# Patient Record
Sex: MALE | Race: OTHER | HISPANIC OR LATINO
[De-identification: names, ages, dates, MRNs, and addresses within clinical notes are randomized per-mention and may not be internally consistent; named-entity substitution may affect disease eponyms.]

---

## 2024-03-20 PROBLEM — Z00.00 ENCOUNTER FOR PREVENTIVE HEALTH EXAMINATION: Status: ACTIVE | Noted: 2024-03-20

## 2024-04-01 ENCOUNTER — RESULT CHARGE (OUTPATIENT)
Age: 64
End: 2024-04-01

## 2024-04-01 ENCOUNTER — APPOINTMENT (OUTPATIENT)
Dept: ORTHOPEDIC SURGERY | Facility: CLINIC | Age: 64
End: 2024-04-01
Payer: COMMERCIAL

## 2024-04-01 DIAGNOSIS — Z78.9 OTHER SPECIFIED HEALTH STATUS: ICD-10-CM

## 2024-04-01 DIAGNOSIS — M47.26 OTHER SPONDYLOSIS WITH RADICULOPATHY, LUMBAR REGION: ICD-10-CM

## 2024-04-01 PROCEDURE — 99203 OFFICE O/P NEW LOW 30 MIN: CPT

## 2024-04-01 PROCEDURE — 72100 X-RAY EXAM L-S SPINE 2/3 VWS: CPT

## 2024-04-01 NOTE — IMAGING
[de-identified] : He is alert and oriented vital signs are stable.  He is able to stand on his toes and his heels  Examination lumbar sacral spine reveals tenderness palpation the right paraspinal region with no evidence for spasm.  He is able to forward flex to 50 degrees extension of the spine of 15 degrees lateral rotation of 20 degrees and lateral flexion of 20 degrees.  He had 5-5 strength bilateral lower extremities 2+ deep tendon reflexes throughout negative straight leg raising  He has a normal neurologic exam normal vascular exam normal skin exam.  There is no evidence of open wounds infection or compartment syndrome.  Examination of the right small finger and hand reveals some tenderness in the palm region around the flexor tendon sheath and A1 pulley region.  There is no evidence for swelling.  His flexor digitorum superficialis tendon was intact.  His FDP tendon was not intact at this time.  He was unable to make a full fist as the FDP did not appear intact.  No locking or clicking his extensor tendon was intact.  There is no evidence for open wounds. [FreeTextEntry1] : X-rays lumbar sacral spine 2 views revealed no fractures or dislocations.  There is evidence for some degenerative disc disease in the lumbar spine most pronounced at L5-S1

## 2024-04-01 NOTE — HISTORY OF PRESENT ILLNESS
[de-identified] : Patient 63-year-old male presents with a 1 month history of low back pain.  He says his pain in the low back on the right side going down the right leg to the foot.  Denies any numbness or tingling down the legs or any bowel bladder symptoms.  He denies any injury.  He is seen with our  Romie our medical assistant  Patient is also complaining of 1 month history of problems bending his right small finger.  Patient states he was in [place] and lifting something and since times not been able to flex the tip of his right small finger.  Denies any lacerations at that time.

## 2024-04-01 NOTE — ASSESSMENT
[FreeTextEntry1] : Lumbosacral degenerative disc disease with complaints of radiculopathy Possible flexor tendon injury right hand/small finger FDP  Plan anti-inflammatories with GI precautions the heating pad and I recommend a course of conservative treatment of physical therapy.  If does not respond to physical therapy he may require an MRI.  He was seen with a .  He was advised not respond to physical therapy in 4 weeks he should follow-up.  All questions were answered.  For the right small finger x-rays were deferred at this time and can be taken when she is seen by Dr. Diop.  I did advise him I do recommend he see one of our other hand surgeons.  I did advise him this time I do recommend he see Dr. Diop for a full consultation.  I did advise him that I am one of the hand surgeons here but not an operative hand surgeon at this time due to underlying medical condition and this could end up requiring surgery as his FDP tendon does not appear to be working at this time.  I did advise him he could have it into the FDP tendon when he was lifting and [place] a month ago I do advise him to do full consultation at this time with one of our operative hand surgeons and I will see Dr. Diop.  Symptoms of Cauda equina were discussed with the patient. Patient was advised if they develop any numbness or tingling down the legs, weakness, or bowel or bladder symptoms they must call the office or go to the ER immediately.  This medical record was created utilizing Dragon voice recognition software.  This software is not perfect and may occasionally create typographical errors which may be reflected in the above medical record.

## 2024-04-03 ENCOUNTER — APPOINTMENT (OUTPATIENT)
Dept: ORTHOPEDIC SURGERY | Facility: CLINIC | Age: 64
End: 2024-04-03
Payer: COMMERCIAL

## 2024-04-03 DIAGNOSIS — Z78.9 OTHER SPECIFIED HEALTH STATUS: ICD-10-CM

## 2024-04-03 PROCEDURE — 99213 OFFICE O/P EST LOW 20 MIN: CPT

## 2024-04-03 PROCEDURE — 73130 X-RAY EXAM OF HAND: CPT | Mod: RT

## 2024-04-03 RX ORDER — ALLOPURINOL 100 MG/1
100 TABLET ORAL
Qty: 90 | Refills: 0 | Status: ACTIVE | COMMUNITY
Start: 2024-03-20

## 2024-04-03 RX ORDER — COLCHICINE 0.6 MG/1
0.6 TABLET ORAL
Qty: 3 | Refills: 0 | Status: ACTIVE | COMMUNITY
Start: 2024-03-20

## 2024-04-03 RX ORDER — NAPROXEN 500 MG/1
500 TABLET ORAL
Qty: 14 | Refills: 0 | Status: ACTIVE | COMMUNITY
Start: 2024-03-20

## 2024-04-03 RX ORDER — CYCLOBENZAPRINE HYDROCHLORIDE 10 MG/1
10 TABLET, FILM COATED ORAL
Qty: 20 | Refills: 0 | Status: ACTIVE | COMMUNITY
Start: 2024-03-20

## 2024-04-03 RX ORDER — ATORVASTATIN CALCIUM 10 MG/1
10 TABLET, FILM COATED ORAL
Qty: 90 | Refills: 0 | Status: ACTIVE | COMMUNITY
Start: 2024-03-22

## 2024-04-03 NOTE — IMAGING
[de-identified] : RIGHT HAND skin intact. no swelling. TTP to SF A1 pulley. wrist ROM: good extension, flexion. good pronation, supination. SF: good extension. PIPJ mild limited flexion, DIPJ no active flexion but good passive flexion. good flex/ext other digits.  SILT to median, ulnar, radial distribution.  palpable radial pulse, brisk cap refill all digits. no triggering.   XRAYS OF RIGHT HAND: no acute displaced fracture or dislocation. djd of DIPJ of index, middle, small fingers and PIPJ of index finger. djd of radiocarpal joint with well-corticated ossification dorsal to radiocarpal joint.

## 2024-04-03 NOTE — ASSESSMENT
[FreeTextEntry1] : The condition was explained to the patient - likely FDP rupture. Unlikely to be amenable to primary repair due to chronicity of injury. Could consider flexor tendon reconstruction, but increased risk of stiffness with intact FDS and underlying arthritis. Also discussed increased propensity for stiffness in small finger, especially PIPJ. - MRI R small finger to evaluate for flexor tendon rupture. - applied diana straps to SF/RF, PRN activity. reviewed application of strap (slide only one finger through loop, then strap to adjacent finger, tight enough that the strap does not slide off, but not so tight that it causes indentation or discoloration. patient expressed understanding. - prescribed OT for R hand  F/u after MRI.

## 2024-04-03 NOTE — HISTORY OF PRESENT ILLNESS
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 Denies numbness/tingling. Saw Dr. Mathews on 4/1/24 => referred here.  Hx: HLD. Gout. [] : no [FreeTextEntry5] : R. Hand pain that started 1 month ago w/o known injury. Pain along the right pinky finger radiating into wrist. Needs .  [FreeTextEntry7] : right wrist

## 2024-04-15 ENCOUNTER — APPOINTMENT (OUTPATIENT)
Dept: MRI IMAGING | Facility: CLINIC | Age: 64
End: 2024-04-15
Payer: COMMERCIAL

## 2024-04-15 PROCEDURE — 73218 MRI UPPER EXTREMITY W/O DYE: CPT | Mod: RT

## 2024-04-25 ENCOUNTER — APPOINTMENT (OUTPATIENT)
Dept: ORTHOPEDIC SURGERY | Facility: CLINIC | Age: 64
End: 2024-04-25
Payer: COMMERCIAL

## 2024-04-25 ENCOUNTER — NON-APPOINTMENT (OUTPATIENT)
Age: 64
End: 2024-04-25

## 2024-04-25 VITALS — WEIGHT: 205 LBS | BODY MASS INDEX: 32.18 KG/M2 | HEIGHT: 67 IN

## 2024-04-25 DIAGNOSIS — S66.801A: ICD-10-CM

## 2024-04-25 PROCEDURE — 20550 NJX 1 TENDON SHEATH/LIGAMENT: CPT

## 2024-04-25 PROCEDURE — 99203 OFFICE O/P NEW LOW 30 MIN: CPT | Mod: 25

## 2024-04-29 ENCOUNTER — APPOINTMENT (OUTPATIENT)
Dept: ORTHOPEDIC SURGERY | Facility: CLINIC | Age: 64
End: 2024-04-29
Payer: COMMERCIAL

## 2024-04-29 DIAGNOSIS — M47.816 SPONDYLOSIS W/OUT MYELOPATHY OR RADICULOPATHY, LUMBAR REGION: ICD-10-CM

## 2024-04-29 PROCEDURE — 99213 OFFICE O/P EST LOW 20 MIN: CPT

## 2024-04-29 NOTE — ASSESSMENT
[FreeTextEntry1] : Lumbosacral degenerative disc disease with complaints of radiculopathy  Plan anti-inflammatories with GI precautions the heating pad and I recommend he resume physical therapy.  I did advise him to get back into the physical therapy he will be seen back in 4 to 6 weeks.  If symptoms not improving may need an MRI at that time.  At this point does not require the MRI as there is no radicular symptoms. All questions were answered agreed with the plan.  He will continue with the hand doctor that is treating him. he is going away for 2 weeks and will start PT when he returns.  Symptoms of Cauda equina were discussed with the patient. Patient was advised if they develop any numbness or tingling down the legs, weakness, or bowel or bladder symptoms they must call the office or go to the ER immediately.  This medical record was created utilizing Dragon voice recognition software.  This software is not perfect and may occasionally create typographical errors which may be reflected in the above medical record.

## 2024-04-29 NOTE — IMAGING
[de-identified] : He is alert and oriented vital signs are stable.  He is able to stand on his toes and his heels  Examination lumbar sacral spine reveals tenderness palpation the right paraspinal region with no evidence for spasm.  He is able to forward flex to 50 degrees extension of the spine of 15 degrees lateral rotation of 20 degrees and lateral flexion of 20 degrees.  He had 5-5 strength bilateral lower extremities 2+ deep tendon reflexes throughout negative straight leg raising  He has a normal neurologic exam normal vascular exam normal skin exam.  There is no evidence of open wounds infection or compartment syndrome.   [FreeTextEntry1] : X-rays lumbar sacral spine 2 views revealed no fractures or dislocations.  There is evidence for some degenerative disc disease in the lumbar spine most pronounced at L5-S1

## 2024-04-29 NOTE — HISTORY OF PRESENT ILLNESS
[de-identified] : Patient 63-year-old male presents with a 1 month history of low back pain.  He says his pain in the low back on the right side going down the right leg to the foot.  Denies any numbness or tingling down the legs or any bowel bladder symptoms.  He denies any injury.  He is seen with our  Romie our medical assistant  Patient is also complaining of 1 month history of problems bending his right small finger.  Patient states he was in [place] and lifting something and since times not been able to flex the tip of his right small finger.  Denies any lacerations at that time.  April 29, 2024.  Patient is to follow-up for his lumbar spine.  He is seen with his daughter who translated.  He says his low back has improved some but still having pain on the right side.  He only went to physical therapy 1 time.  He is does not have any numbness or tingling down the legs at this time.  He says that improved.  Denies any bowel bladder symptoms.  Dr. Diop about his hand and had MRI performed and then saw Dr. Cosby and had an injection is treating with them at this time.

## 2024-05-06 ENCOUNTER — APPOINTMENT (OUTPATIENT)
Dept: ORTHOPEDIC SURGERY | Facility: CLINIC | Age: 64
End: 2024-05-06

## 2025-08-19 ENCOUNTER — OFFICE (OUTPATIENT)
Facility: LOCATION | Age: 65
Setting detail: OPHTHALMOLOGY
End: 2025-08-19
Payer: MEDICAID

## 2025-08-19 DIAGNOSIS — H16.223: ICD-10-CM

## 2025-08-19 DIAGNOSIS — H25.13: ICD-10-CM

## 2025-08-19 DIAGNOSIS — H40.013: ICD-10-CM

## 2025-08-19 PROCEDURE — 92133 CPTRZD OPH DX IMG PST SGM ON: CPT | Performed by: OPHTHALMOLOGY

## 2025-08-19 PROCEDURE — 92020 GONIOSCOPY: CPT | Performed by: OPHTHALMOLOGY

## 2025-08-19 PROCEDURE — 76514 ECHO EXAM OF EYE THICKNESS: CPT | Performed by: OPHTHALMOLOGY

## 2025-08-19 PROCEDURE — 92083 EXTENDED VISUAL FIELD XM: CPT | Performed by: OPHTHALMOLOGY

## 2025-08-19 PROCEDURE — 92004 COMPRE OPH EXAM NEW PT 1/>: CPT | Performed by: OPHTHALMOLOGY

## 2025-08-19 ASSESSMENT — KERATOMETRY
OD_AXISANGLE_DEGREES: 141
OS_K2POWER_DIOPTERS: 41.75
OD_K1POWER_DIOPTERS: 40.75
OS_K1POWER_DIOPTERS: 40.75
OD_K2POWER_DIOPTERS: 41.25
OS_AXISANGLE_DEGREES: 059

## 2025-08-19 ASSESSMENT — TONOMETRY
OS_IOP_MMHG: 16
OD_IOP_MMHG: 15

## 2025-08-19 ASSESSMENT — REFRACTION_AUTOREFRACTION
OD_AXIS: 077
OS_AXIS: 126
OD_CYLINDER: -1.25
OS_SPHERE: +1.75
OS_CYLINDER: -1.25
OD_SPHERE: +1.25

## 2025-08-19 ASSESSMENT — SUPERFICIAL PUNCTATE KERATITIS (SPK)
OS_SPK: 2+
OD_SPK: 2+

## 2025-08-19 ASSESSMENT — PACHYMETRY
OS_CT_UM: 546
OD_CT_UM: 563
OD_CT_CORRECTION: -1
OS_CT_CORRECTION: 0

## 2025-08-19 ASSESSMENT — CONFRONTATIONAL VISUAL FIELD TEST (CVF)
OD_FINDINGS: FULL
OS_FINDINGS: FULL

## 2025-08-19 ASSESSMENT — VISUAL ACUITY
OD_BCVA: 20/40
OS_BCVA: 20/40+1